# Patient Record
Sex: FEMALE | URBAN - METROPOLITAN AREA
[De-identification: names, ages, dates, MRNs, and addresses within clinical notes are randomized per-mention and may not be internally consistent; named-entity substitution may affect disease eponyms.]

---

## 2024-06-11 ENCOUNTER — APPOINTMENT (RX ONLY)
Dept: URBAN - METROPOLITAN AREA CLINIC 114 | Facility: CLINIC | Age: 44
Setting detail: DERMATOLOGY
End: 2024-06-11

## 2024-06-11 DIAGNOSIS — L259 CONTACT DERMATITIS AND OTHER ECZEMA, UNSPECIFIED CAUSE: ICD-10-CM | Status: WORSENING

## 2024-06-11 DIAGNOSIS — R21 RASH AND OTHER NONSPECIFIC SKIN ERUPTION: ICD-10-CM | Status: WORSENING

## 2024-06-11 PROBLEM — L23.9 ALLERGIC CONTACT DERMATITIS, UNSPECIFIED CAUSE: Status: ACTIVE | Noted: 2024-06-11

## 2024-06-11 PROCEDURE — 11104 PUNCH BX SKIN SINGLE LESION: CPT

## 2024-06-11 PROCEDURE — ? BIOPSY BY PUNCH METHOD

## 2024-06-11 PROCEDURE — ? PRESCRIPTION

## 2024-06-11 PROCEDURE — 99204 OFFICE O/P NEW MOD 45 MIN: CPT | Mod: 25

## 2024-06-11 PROCEDURE — 11105 PUNCH BX SKIN EA SEP/ADDL: CPT

## 2024-06-11 PROCEDURE — ? COUNSELING

## 2024-06-11 PROCEDURE — ? DIAGNOSIS COMMENT

## 2024-06-11 RX ORDER — TRIAMCINOLONE ACETONIDE 1 MG/G
OINTMENT TOPICAL
Qty: 454 | Refills: 3 | Status: ERX | COMMUNITY
Start: 2024-06-11

## 2024-06-11 RX ADMIN — TRIAMCINOLONE ACETONIDE: 1 OINTMENT TOPICAL at 00:00

## 2024-06-11 ASSESSMENT — LOCATION DETAILED DESCRIPTION DERM
LOCATION DETAILED: RIGHT LATERAL ABDOMEN
LOCATION DETAILED: LEFT SUPERIOR MEDIAL LOWER BACK

## 2024-06-11 ASSESSMENT — LOCATION ZONE DERM: LOCATION ZONE: TRUNK

## 2024-06-11 ASSESSMENT — ITCH NUMERIC RATING SCALE: HOW SEVERE IS YOUR ITCHING?: 7

## 2024-06-11 ASSESSMENT — LOCATION SIMPLE DESCRIPTION DERM
LOCATION SIMPLE: LEFT LOWER BACK
LOCATION SIMPLE: ABDOMEN

## 2024-06-11 ASSESSMENT — BSA RASH: BSA RASH: 10

## 2024-06-11 NOTE — PROCEDURE: DIAGNOSIS COMMENT
Comment: Patient states she has never had a rash like this before. Patient states it has been spreading for about 1 month. It is itching but is not the result of changing any skincare or household products. Patient states she has changed to natural products and it has not helped. Discussed the only way to diagnose this rash is to biopsy the area. This could be an allergic reaction or could also be an autoimmune disease such as lupus but the only way to differentiate is a biopsy. Patient states she is allergic to the sun and discussed yes some people can be allergic but this could also be some underlying condition.
Detail Level: Simple
Render Risk Assessment In Note?: no

## 2024-06-11 NOTE — PROCEDURE: BIOPSY BY PUNCH METHOD
